# Patient Record
Sex: MALE | Race: BLACK OR AFRICAN AMERICAN
[De-identification: names, ages, dates, MRNs, and addresses within clinical notes are randomized per-mention and may not be internally consistent; named-entity substitution may affect disease eponyms.]

---

## 2020-06-12 ENCOUNTER — HOSPITAL ENCOUNTER (EMERGENCY)
Dept: HOSPITAL 62 - ER | Age: 25
Discharge: HOME | End: 2020-06-12
Payer: SELF-PAY

## 2020-06-12 VITALS — DIASTOLIC BLOOD PRESSURE: 88 MMHG | SYSTOLIC BLOOD PRESSURE: 138 MMHG

## 2020-06-12 DIAGNOSIS — Z88.8: ICD-10-CM

## 2020-06-12 DIAGNOSIS — R06.02: Primary | ICD-10-CM

## 2020-06-12 DIAGNOSIS — F17.200: ICD-10-CM

## 2020-06-12 DIAGNOSIS — Z88.2: ICD-10-CM

## 2020-06-12 LAB
ADD MANUAL DIFF: NO
ALBUMIN SERPL-MCNC: 4.9 G/DL (ref 3.5–5)
ALP SERPL-CCNC: 66 U/L (ref 38–126)
ANION GAP SERPL CALC-SCNC: 9 MMOL/L (ref 5–19)
AST SERPL-CCNC: 33 U/L (ref 17–59)
BASOPHILS # BLD AUTO: 0 10^3/UL (ref 0–0.2)
BASOPHILS NFR BLD AUTO: 0.7 % (ref 0–2)
BILIRUB DIRECT SERPL-MCNC: 0.1 MG/DL (ref 0–0.4)
BILIRUB SERPL-MCNC: 0.7 MG/DL (ref 0.2–1.3)
BUN SERPL-MCNC: 13 MG/DL (ref 7–20)
CALCIUM: 10.1 MG/DL (ref 8.4–10.2)
CHLORIDE SERPL-SCNC: 102 MMOL/L (ref 98–107)
CO2 SERPL-SCNC: 28 MMOL/L (ref 22–30)
EOSINOPHIL # BLD AUTO: 0.3 10^3/UL (ref 0–0.6)
EOSINOPHIL NFR BLD AUTO: 4.7 % (ref 0–6)
ERYTHROCYTE [DISTWIDTH] IN BLOOD BY AUTOMATED COUNT: 13.5 % (ref 11.5–14)
GLUCOSE SERPL-MCNC: 95 MG/DL (ref 75–110)
HCT VFR BLD CALC: 47.5 % (ref 37.9–51)
HGB BLD-MCNC: 15.8 G/DL (ref 13.5–17)
LYMPHOCYTES # BLD AUTO: 1.7 10^3/UL (ref 0.5–4.7)
LYMPHOCYTES NFR BLD AUTO: 27.4 % (ref 13–45)
MCH RBC QN AUTO: 27.6 PG (ref 27–33.4)
MCHC RBC AUTO-ENTMCNC: 33.2 G/DL (ref 32–36)
MCV RBC AUTO: 83 FL (ref 80–97)
MONOCYTES # BLD AUTO: 0.5 10^3/UL (ref 0.1–1.4)
MONOCYTES NFR BLD AUTO: 8 % (ref 3–13)
NEUTROPHILS # BLD AUTO: 3.6 10^3/UL (ref 1.7–8.2)
NEUTS SEG NFR BLD AUTO: 59.2 % (ref 42–78)
PLATELET # BLD: 247 10^3/UL (ref 150–450)
POTASSIUM SERPL-SCNC: 4.1 MMOL/L (ref 3.6–5)
PROT SERPL-MCNC: 8.2 G/DL (ref 6.3–8.2)
RBC # BLD AUTO: 5.72 10^6/UL (ref 4.35–5.55)
TOTAL CELLS COUNTED % (AUTO): 100 %
WBC # BLD AUTO: 6.1 10^3/UL (ref 4–10.5)

## 2020-06-12 PROCEDURE — 84484 ASSAY OF TROPONIN QUANT: CPT

## 2020-06-12 PROCEDURE — 84443 ASSAY THYROID STIM HORMONE: CPT

## 2020-06-12 PROCEDURE — 85379 FIBRIN DEGRADATION QUANT: CPT

## 2020-06-12 PROCEDURE — 71045 X-RAY EXAM CHEST 1 VIEW: CPT

## 2020-06-12 PROCEDURE — 93010 ELECTROCARDIOGRAM REPORT: CPT

## 2020-06-12 PROCEDURE — 93005 ELECTROCARDIOGRAM TRACING: CPT

## 2020-06-12 PROCEDURE — 36415 COLL VENOUS BLD VENIPUNCTURE: CPT

## 2020-06-12 PROCEDURE — 83735 ASSAY OF MAGNESIUM: CPT

## 2020-06-12 PROCEDURE — 85025 COMPLETE CBC W/AUTO DIFF WBC: CPT

## 2020-06-12 PROCEDURE — 99285 EMERGENCY DEPT VISIT HI MDM: CPT

## 2020-06-12 PROCEDURE — 80053 COMPREHEN METABOLIC PANEL: CPT

## 2020-06-12 NOTE — ER DOCUMENT REPORT
ED Respiratory Problem





- General


Chief Complaint: Breathing Difficulty


Stated Complaint: DIFFICULTY BREATHING,HANDS TINGLING


Time Seen by Provider: 06/12/20 12:43


Mode of Arrival: Ambulatory


Notes: 





CHIEF COMPLAINT: Shortness of breath





HPI: 25-year-old male presenting for evaluation of shortness of breath that 

occurred acutely while at work today.  Patient had slight chest discomfort with 

the shortness of breath but did not feel like his heartbeat was racing or 

irregular.  States symptoms are slowly improving currently.  Patient did not 

notice himself wheezing.  No positive COVID patients at work.  Patient has not 

had a fever cough or recent illness.





ROS: See HPI - all other systems were reviewed and are otherwise negative


Constitutional: no fever 


Eyes: no drainage, no blurred vision


ENT: no runny nose, no sore throat


Cardiovascular:  + chest pain 


Resp: + SOB, no cough


GI: no vomiting, no diarrhea, no abdominal pain


: no dysuria


Integumentary: no rash 


Allergy: no hives 


Musculoskeletal: no extremity pain or swelling 


Neurological: no numbness/tingling, no weakness





MEDICATIONS: I agree with the patient medications as charted by the RN.





ALLERGIES: I agree with the allergies as charted by the RN.





PAST MEDICAL HISTORY/PAST SURGICAL HISTORY: Reviewed and agree as charted by RN.





SOCIAL HISTORY: Reviewed and agree as charted by RN.





FAMILY HISTORY: No significant familial comorbid conditions directly related to 

patient complaint





EXAM:


Reviewed vital signs as charted by RN.


CONSTITUTIONAL: Alert and oriented and responds appropriately to questions. 

Well-appearing; well-nourished


HEAD: Normocephalic; atraumatic


EYES: PERRL; Conjunctivae clear, sclerae non-icteric


ENT: normal nose; no rhinorrhea; moist mucous membranes; pharynx without lesions

noted, no uvula edema or deviation, no tonsillar hypertrophy, phonation normal


NECK: Supple without meningismus; non-tender; no cervical lymphadenopathy, no 

masses


CARD: RRR; no murmurs, no clicks, no rubs, no gallops; symmetric distal pulses


RESP: Normal chest excursion without splinting or tachypnea; breath sounds clear

and equal bilaterally; no wheezes, no rhonchi, no rales, pulse oximetry 98% on 

room air not hypoxic


ABD/GI: Normal bowel sounds; non-distended; soft, non-tender, no rebound, no 

guarding; no palpable organomegaly or masses.


BACK:  The back appears normal and is non-tender to palpation, there is no CVA 

tenderness


EXT: Normal ROM in all joints; non-tender to palpation; no cyanosis, no 

effusions, no edema   


SKIN: Normal color for age and race; warm; dry; good turgor; no acute lesions 

noted


NEURO: Moves all extremities equally; Motor and sensory function intact 


PSYCH: The patient's mood and manner are appropriate. Grooming and personal 

hygiene are appropriate.





MDM: 25-year-old male who is otherwise healthy presenting for acute shortness of

breath while at work today.  Patient was wearing a mask at the time.  States he 

had a little bit of chest pain at the time that did not radiate into the back 

neck or arms.  Unable to clearly define how the chest discomfort felt.  Patient 

lung sounds are clear to auscultation he is not tachypneic tachycardic or 

hypoxic here.  Patient's EKG sinus rhythm with a ventricular rate of 55, there 

is some ST changes suggesting early repolarization.  Will obtain 1 set of 

screening cardiac labs but include TSH magnesium and d-dimer given the sudden 

shortness of breath


TRAVEL OUTSIDE OF THE [place] IN LAST 30 DAYS: No





- Related Data


Allergies/Adverse Reactions: 


                                        





sulfamethoxazole [From Bactrim] Allergy (Intermediate, Verified 06/12/20 12:42)


   Hives


trimethoprim [From Bactrim] Allergy (Intermediate, Verified 06/12/20 12:42)


   Hives











Past Medical History





- General


Information source: Patient





- Social History


Smoking Status: Current Some Day Smoker


Chew tobacco use (# tins/day): No


Frequency of alcohol use: None


Drug Abuse: None


Family History: Reviewed & Not Pertinent


Patient has homicidal ideation: No


Pulmonary Medical History: Reports: Hx Asthma





- Immunizations


Immunizations up to date: Yes


Hx Diphtheria, Pertussis, Tetanus Vaccination: Yes





Physical Exam





- Vital signs


Vitals: 


                                        











Temp Pulse Resp BP Pulse Ox


 


 97.2 F   79   23 H  116/79   100 


 


 06/12/20 12:23  06/12/20 12:23  06/12/20 12:23  06/12/20 12:23  06/12/20 12:23














Course





- Re-evaluation


Re-evalutation: 





06/12/20 15:57


Lab work does not show acute emergent abnormalities.  EKG shows early 

repolarization.  Will refer to cardiology for outpatient evaluation and follow-

up.





- Vital Signs


Vital signs: 


                                        











Temp Pulse Resp BP Pulse Ox


 


 97.2 F   79   23 H  116/79   100 


 


 06/12/20 12:43  06/12/20 12:23  06/12/20 12:23  06/12/20 12:23  06/12/20 12:23














- Laboratory


Result Diagrams: 


                                 06/12/20 13:30





                                 06/12/20 13:30


Laboratory results interpreted by me: 


                                        











  06/12/20





  13:30


 


RBC  5.72 H














Discharge





- Discharge


Clinical Impression: 


 Shortness of breath





Condition: Stable


Disposition: HOME, SELF-CARE


Instructions:  Dyspnea, Nonspecific ([facility])


Additional Instructions: 


Use the albuterol inhaler 2 puffs every 4 hours as needed if you feel you are 

having an asthma issue.  Your lab work today did not show acute emergent 

abnormalities or definitive reason for your symptoms.  We did evaluate heart lab

s and coagulation studies.  Follow-up closely with cardiology if you develop 

recurrent chest pain or shortness of breath


Prescriptions: 


Albuterol Sulfate [Proair HFA Inhalation Aerosol 8.5 gm MDI] 2 puff IH Q4H PRN 

#1 mdi


 PRN Reason: 


Referrals: 


YAHAIRA HUSSEIN MD [ACTIVE STAFF] - Follow up as needed

## 2020-06-12 NOTE — RADIOLOGY REPORT (SQ)
EXAM DESCRIPTION:  CHEST SINGLE VIEW



IMAGES COMPLETED DATE/TIME:  6/12/2020 1:18 pm



REASON FOR STUDY:  short of breath



COMPARISON:  None.



EXAM PARAMETERS:  NUMBER OF VIEWS: One view.

TECHNIQUE: Single frontal radiographic view of the chest acquired.

RADIATION DOSE: NA

LIMITATIONS: None.



FINDINGS:  LUNGS AND PLEURA: No opacities, masses or pneumothorax. No pleural effusion.

MEDIASTINUM AND HILAR STRUCTURES: No masses.  Contour normal.

HEART AND VASCULAR STRUCTURES: Heart normal in size.  Normal vasculature.

BONES: No acute findings.

HARDWARE: None in the chest.

OTHER: No other significant finding.



IMPRESSION:  NO ACUTE RADIOGRAPHIC FINDING IN THE CHEST.



TECHNICAL DOCUMENTATION:  JOB ID:  [id number]

 2011 [employer]- All Rights Reserved



Reading location - IP/workstation name: [email or web address]

## 2020-06-12 NOTE — EKG REPORT
SEVERITY:- NORMAL ECG -

SINUS RHYTHM

ST ELEV, PROBABLE NORMAL EARLY REPOL PATTERN

:

Confirmed by: Liz Hicks MD 12-Jun-2020 19:17:46

## 2020-06-12 NOTE — ER DOCUMENT REPORT
ED Medical Screen (RME)





- General


Chief Complaint: Breathing Difficulty


Stated Complaint: DIFFICULTY BREATHING,HANDS TINGLING


Time Seen by Provider: 06/12/20 12:43


Mode of Arrival: Ambulatory


Information source: Patient


Notes: 





25-year-old man presented to ED for complaint of shortness of breath that 

started this morning.  He states he had chest pain earlier but not now.  He 

states he had some face and hand tingling earlier but they have relieved now.  

He states he still having shortness of breath.  He states during the night he 

was very short of breath.  He states he works at [employer] and usually always 

has his mask on.  He states he has a history of asthma but has not used any 

medications or had any symptoms of asthma since 2014.  Lungs are clear to 

auscultation there is no wheezing at all.

















I have greeted and performed a rapid initial assessment of this patient.  A 

comprehensive ED assessment and evaluation of the patient, analysis of test 

results and completion of medical decision making process will be conducted by 

an additional ED providers.


TRAVEL OUTSIDE OF THE [place] IN LAST 30 DAYS: No





- Related Data


Allergies/Adverse Reactions: 


                                        





sulfamethoxazole [From Bactrim] Allergy (Intermediate, Verified 06/12/20 12:42)


   Hives


trimethoprim [From Bactrim] Allergy (Intermediate, Verified 06/12/20 12:42)


   Hives











Past Medical History


Pulmonary Medical History: Reports: Hx Asthma





- Immunizations


Immunizations up to date: Yes


Hx Diphtheria, Pertussis, Tetanus Vaccination: Yes





Physical Exam





- Vital signs


Vitals: 





                                        











Temp Pulse Resp BP Pulse Ox


 


 97.2 F   79   23 H  116/79   100 


 


 06/12/20 12:23  06/12/20 12:23  06/12/20 12:23  06/12/20 12:23  06/12/20 12:23














Course





- Vital Signs


Vital signs: 





                                        











Temp Pulse Resp BP Pulse Ox


 


 97.2 F   79   23 H  116/79   100 


 


 06/12/20 12:23  06/12/20 12:23  06/12/20 12:23  06/12/20 12:23  06/12/20 12:23